# Patient Record
Sex: MALE | Race: WHITE | NOT HISPANIC OR LATINO
[De-identification: names, ages, dates, MRNs, and addresses within clinical notes are randomized per-mention and may not be internally consistent; named-entity substitution may affect disease eponyms.]

---

## 2022-10-15 ENCOUNTER — NON-APPOINTMENT (OUTPATIENT)
Age: 57
End: 2022-10-15

## 2022-10-16 ENCOUNTER — EMERGENCY (EMERGENCY)
Facility: HOSPITAL | Age: 57
LOS: 1 days | Discharge: HOME | End: 2022-10-16
Attending: STUDENT IN AN ORGANIZED HEALTH CARE EDUCATION/TRAINING PROGRAM | Admitting: STUDENT IN AN ORGANIZED HEALTH CARE EDUCATION/TRAINING PROGRAM

## 2022-10-16 VITALS
OXYGEN SATURATION: 100 % | HEART RATE: 85 BPM | SYSTOLIC BLOOD PRESSURE: 126 MMHG | TEMPERATURE: 98 F | RESPIRATION RATE: 16 BRPM | DIASTOLIC BLOOD PRESSURE: 89 MMHG

## 2022-10-16 VITALS
TEMPERATURE: 98 F | RESPIRATION RATE: 18 BRPM | DIASTOLIC BLOOD PRESSURE: 68 MMHG | OXYGEN SATURATION: 98 % | SYSTOLIC BLOOD PRESSURE: 135 MMHG | HEART RATE: 85 BPM

## 2022-10-16 LAB
ALBUMIN SERPL ELPH-MCNC: 4.1 G/DL — SIGNIFICANT CHANGE UP (ref 3.3–5)
ALP SERPL-CCNC: 73 U/L — SIGNIFICANT CHANGE UP (ref 40–120)
ALT FLD-CCNC: 19 U/L — SIGNIFICANT CHANGE UP (ref 4–41)
ANION GAP SERPL CALC-SCNC: 11 MMOL/L — SIGNIFICANT CHANGE UP (ref 7–14)
AST SERPL-CCNC: 23 U/L — SIGNIFICANT CHANGE UP (ref 4–40)
BASOPHILS # BLD AUTO: 0.05 K/UL — SIGNIFICANT CHANGE UP (ref 0–0.2)
BASOPHILS NFR BLD AUTO: 0.5 % — SIGNIFICANT CHANGE UP (ref 0–2)
BILIRUB SERPL-MCNC: 0.6 MG/DL — SIGNIFICANT CHANGE UP (ref 0.2–1.2)
BUN SERPL-MCNC: 14 MG/DL — SIGNIFICANT CHANGE UP (ref 7–23)
CALCIUM SERPL-MCNC: 9.1 MG/DL — SIGNIFICANT CHANGE UP (ref 8.4–10.5)
CHLORIDE SERPL-SCNC: 100 MMOL/L — SIGNIFICANT CHANGE UP (ref 98–107)
CO2 SERPL-SCNC: 24 MMOL/L — SIGNIFICANT CHANGE UP (ref 22–31)
CREAT SERPL-MCNC: 1.02 MG/DL — SIGNIFICANT CHANGE UP (ref 0.5–1.3)
EGFR: 86 ML/MIN/1.73M2 — SIGNIFICANT CHANGE UP
EOSINOPHIL # BLD AUTO: 0.03 K/UL — SIGNIFICANT CHANGE UP (ref 0–0.5)
EOSINOPHIL NFR BLD AUTO: 0.3 % — SIGNIFICANT CHANGE UP (ref 0–6)
GLUCOSE SERPL-MCNC: 90 MG/DL — SIGNIFICANT CHANGE UP (ref 70–99)
HCT VFR BLD CALC: 42.6 % — SIGNIFICANT CHANGE UP (ref 39–50)
HGB BLD-MCNC: 14.6 G/DL — SIGNIFICANT CHANGE UP (ref 13–17)
IANC: 7.69 K/UL — HIGH (ref 1.8–7.4)
IMM GRANULOCYTES NFR BLD AUTO: 0.3 % — SIGNIFICANT CHANGE UP (ref 0–0.9)
LIDOCAIN IGE QN: 35 U/L — SIGNIFICANT CHANGE UP (ref 7–60)
LYMPHOCYTES # BLD AUTO: 1.94 K/UL — SIGNIFICANT CHANGE UP (ref 1–3.3)
LYMPHOCYTES # BLD AUTO: 18.1 % — SIGNIFICANT CHANGE UP (ref 13–44)
MCHC RBC-ENTMCNC: 30.4 PG — SIGNIFICANT CHANGE UP (ref 27–34)
MCHC RBC-ENTMCNC: 34.3 GM/DL — SIGNIFICANT CHANGE UP (ref 32–36)
MCV RBC AUTO: 88.6 FL — SIGNIFICANT CHANGE UP (ref 80–100)
MONOCYTES # BLD AUTO: 1 K/UL — HIGH (ref 0–0.9)
MONOCYTES NFR BLD AUTO: 9.3 % — SIGNIFICANT CHANGE UP (ref 2–14)
NEUTROPHILS # BLD AUTO: 7.69 K/UL — HIGH (ref 1.8–7.4)
NEUTROPHILS NFR BLD AUTO: 71.5 % — SIGNIFICANT CHANGE UP (ref 43–77)
NRBC # BLD: 0 /100 WBCS — SIGNIFICANT CHANGE UP (ref 0–0)
NRBC # FLD: 0 K/UL — SIGNIFICANT CHANGE UP (ref 0–0)
PLATELET # BLD AUTO: 262 K/UL — SIGNIFICANT CHANGE UP (ref 150–400)
POTASSIUM SERPL-MCNC: 4.8 MMOL/L — SIGNIFICANT CHANGE UP (ref 3.5–5.3)
POTASSIUM SERPL-SCNC: 4.8 MMOL/L — SIGNIFICANT CHANGE UP (ref 3.5–5.3)
PROT SERPL-MCNC: 7.6 G/DL — SIGNIFICANT CHANGE UP (ref 6–8.3)
RBC # BLD: 4.81 M/UL — SIGNIFICANT CHANGE UP (ref 4.2–5.8)
RBC # FLD: 13.7 % — SIGNIFICANT CHANGE UP (ref 10.3–14.5)
SODIUM SERPL-SCNC: 135 MMOL/L — SIGNIFICANT CHANGE UP (ref 135–145)
TROPONIN T, HIGH SENSITIVITY RESULT: 6 NG/L — SIGNIFICANT CHANGE UP
WBC # BLD: 10.74 K/UL — HIGH (ref 3.8–10.5)
WBC # FLD AUTO: 10.74 K/UL — HIGH (ref 3.8–10.5)

## 2022-10-16 PROCEDURE — 74177 CT ABD & PELVIS W/CONTRAST: CPT | Mod: 26,MA

## 2022-10-16 PROCEDURE — 99285 EMERGENCY DEPT VISIT HI MDM: CPT

## 2022-10-16 PROCEDURE — 71045 X-RAY EXAM CHEST 1 VIEW: CPT | Mod: 26

## 2022-10-16 RX ORDER — AMPICILLIN SODIUM AND SULBACTAM SODIUM 250; 125 MG/ML; MG/ML
3 INJECTION, POWDER, FOR SUSPENSION INTRAMUSCULAR; INTRAVENOUS ONCE
Refills: 0 | Status: COMPLETED | OUTPATIENT
Start: 2022-10-16 | End: 2022-10-16

## 2022-10-16 RX ORDER — SODIUM CHLORIDE 9 MG/ML
1000 INJECTION INTRAMUSCULAR; INTRAVENOUS; SUBCUTANEOUS ONCE
Refills: 0 | Status: COMPLETED | OUTPATIENT
Start: 2022-10-16 | End: 2022-10-16

## 2022-10-16 RX ORDER — KETOROLAC TROMETHAMINE 30 MG/ML
15 SYRINGE (ML) INJECTION ONCE
Refills: 0 | Status: DISCONTINUED | OUTPATIENT
Start: 2022-10-16 | End: 2022-10-16

## 2022-10-16 RX ADMIN — AMPICILLIN SODIUM AND SULBACTAM SODIUM 200 GRAM(S): 250; 125 INJECTION, POWDER, FOR SUSPENSION INTRAMUSCULAR; INTRAVENOUS at 22:46

## 2022-10-16 RX ADMIN — Medication 15 MILLIGRAM(S): at 19:21

## 2022-10-16 RX ADMIN — SODIUM CHLORIDE 1000 MILLILITER(S): 9 INJECTION INTRAMUSCULAR; INTRAVENOUS; SUBCUTANEOUS at 19:20

## 2022-10-16 NOTE — ED PROVIDER NOTE - NS ED ROS FT
CONSTITUTIONAL: No fever,  EYES: No redness  ENT: no sore throat  CARDIOVASCULAR: No chest pain,  RESPIRATORY: No cough, no shortness of breath  GI: +abdominal pain, no nausea, no vomiting, no diarrhea   GENITOURINARY: No dysuria  MUSKULOSKELETAL: No new pain in joints/muscles  SKIN: No rash  NEURO: No headache  ALL OTHER SYSTEMS NEGATIVE.

## 2022-10-16 NOTE — ED PROVIDER NOTE - NSFOLLOWUPINSTRUCTIONS_ED_ALL_ED_FT
Colitis    WHAT YOU NEED TO KNOW:    Colitis is swelling and irritation of your colon. Colitis may be caused by ulcers or a problem with your immune system. Bacteria, a virus, or a parasite may also cause colitis. The cause may not be known. You may have diarrhea, abdominal pain, fever, or blood or mucus in your bowel movement.    DISCHARGE INSTRUCTIONS:    Return to the emergency department if:    You have sudden trouble breathing.    Your bowel movements are black or have blood in them.    You have blood in your vomit.    You have severe abdominal pain or your abdomen is swollen and feels hard.    You have any of the following signs of dehydration:  Dizziness or weakness    Dry mouth, cracked lips, or severe thirst    Fast heartbeat or breathing    Urinating very little or not at all  Call your doctor if:    Your symptoms get worse or do not go away.    You have a fever, chills, cough, or feel weak and achy.    You suddenly lose weight without trying.    You have questions or concerns about your condition or care.  Medicines:    Medicines may be given to decrease inflammation in your colon and treat diarrhea.    Take your medicine as directed. Contact your healthcare provider if you think your medicine is not helping or if you have side effects. Tell him of her if you are allergic to any medicine. Keep a list of the medicines, vitamins, and herbs you take. Include the amounts, and when and why you take them. Bring the list or the pill bottles to follow-up visits. Carry your medicine list with you in case of an emergency.  Manage your symptoms:    Drink liquids as directed to help prevent dehydration. Good liquids to drink include water, juice, and broth. Ask how much liquid to drink each day. You may need to drink an oral rehydration solution (ORS). An ORS contains a balance of water, salt, and sugar to replace body fluids lost during diarrhea.    Eat a variety of healthy foods. Healthy foods include fruits, vegetables, whole-grain breads, beans, low-fat dairy products, lean meats, and fish. You may need to eat several small meals throughout the day instead of large meals. Avoid spicy foods, caffeine, chocolate, and foods high in fat.    Talk to your healthcare provider before you take NSAIDs. NSAIDs can cause worsen your symptoms if ulcers are causing your colitis.    Start to exercise when you feel better. Regular exercise helps your bowels work normally. Ask about the best exercise plan for you.  Prevent the spread of germs:      Wash your hands often. Wash your hands several times each day. Wash after you use the bathroom, change a child's diaper, and before you prepare or eat food. Use soap and water every time. Rub your soapy hands together, lacing your fingers. Wash the front and back of your hands, and in between your fingers. Use the fingers of one hand to scrub under the fingernails of the other hand. Wash for at least 20 seconds. Rinse with warm, running water for several seconds. Then dry your hands with a clean towel or paper towel. Use hand  that contains alcohol if soap and water are not available. Do not touch your eyes, nose, or mouth without washing your hands first.  Handwashing      Cover a sneeze or cough. Use a tissue that covers your mouth and nose. Throw the tissue away in a trash can right away. Use the bend of your arm if a tissue is not available. Wash your hands well with soap and water or use a hand .    Clean surfaces often. Clean doorknobs, countertops, cell phones, and other surfaces that are touched often. Use a disinfecting wipe, a single-use sponge, or a cloth you can wash and reuse. Use disinfecting  if you do not have wipes. You can create a disinfecting  by mixing 1 part bleach with 10 parts water.    Ask about vaccines you may need. Vaccines help prevent disease caused by some viruses and bacteria. Get the influenza (flu) vaccine as soon as recommended each year. The flu vaccine is usually available starting in September or October. Flu viruses change, so it is important to get a flu vaccine every year. Get the pneumonia vaccine if recommended. This vaccine is usually recommended every 5 years. Your provider will tell you when to get this vaccine, if needed. Your healthcare provider can tell you if you should get other vaccines, and when to get them.  Follow up with your doctor as directed: You may need to return for a colonoscopy or other tests. Write down how often you have a bowel movements and what they look like. Bring this to your follow-up visits. Write down your questions so you remember to ask them during your visits.

## 2022-10-16 NOTE — ED PROVIDER NOTE - PROGRESS NOTE DETAILS
has colitis on ct scan, will send abx to pharmacy, gastroenterology follow up, Well appearing. Return precautions reviewed. Patient/family verbalized understand of conditions for return and plan for follow up. Patient/family was instructed to utilize [phone] to obtain follow up as indicated.

## 2022-10-16 NOTE — ED ADULT TRIAGE NOTE - CHIEF COMPLAINT QUOTE
p.t c/o of diffuse abd pain for 2 days, denies any nausea or vomiting, denies dysuria, last BM 3 days ago p.t c/o of diffuse abd pain for 2 days, denies any nausea or vomiting, denies dysuria, last BM 3 days ago, sent by PMD r/p appendicitis

## 2022-10-16 NOTE — ED PROVIDER NOTE - PATIENT PORTAL LINK FT
You can access the FollowMyHealth Patient Portal offered by [facility] by registering at the following website: [email or web address]. By joining [facility]’s FollowMyHealth portal, you will also be able to view your health information using other applications (apps) compatible with our system.

## 2022-10-16 NOTE — ED PROVIDER NOTE - OBJECTIVE STATEMENT
55 y/o m with no PMHx presents to the ED with abdominal pain for 2 days. Pt reports that abdominal pain is radiating to the chest. He went to [facility] and was told that he has an "issue with his appendix" and to come to the ED for a CAT scan. EKG preformed at the Urgent Care was normal. Pain is 6/10 and it comes and goes. He denies fever, vomiting, diarrhea. Pt could not go to the bathroom for 2 days. No medications taken for the pain. No history of abdominal surgeries. No cardiac history. Pt has a stress test done six months ago. No smoking history. Occasional alcohol usage. No known allergies to medications.

## 2022-10-16 NOTE — ED ADULT NURSE NOTE - OBJECTIVE STATEMENT
56 yom presents A&Ox4, c/o non-radiating RLQ abdominal pain x3 days. Pt denies any associated N/V/D. Last normal BM today. Respirations even and unlabored, abdomen tender to RLQ. Pt denies any chest pain, dyspnea, dizziness, blurry vision, recent fevers or trauma. No acute distress noted. 20g IV placed in L AC, labs sent per order. Pending CT. bed in lowest position, side rails up, call bell in hand, safety maintained.

## 2022-10-16 NOTE — ED ADULT NURSE NOTE - NS ED NOTE ABUSE SUSPICION NEGLECT YN
How Severe Is Your Nodular Acne?: moderate Is This A New Presentation, Or A Follow-Up?: Follow Up Nodular Acne No

## 2022-10-16 NOTE — ED ADULT NURSE NOTE - CHIEF COMPLAINT QUOTE
p.t c/o of diffuse abd pain for 2 days, denies any nausea or vomiting, denies dysuria, last BM 3 days ago, sent by PMD r/p appendicitis

## 2022-10-16 NOTE — ED PROVIDER NOTE - CLINICAL SUMMARY MEDICAL DECISION MAKING FREE TEXT BOX
57 y/o m with no PMHx presents to the ED with abdominal pain for 2 days. Will r/o appendicitis vs other possible acute surgical problem. Will order CAT scan and EKG to evaluate for pain that is radiating to the chest.

## 2022-10-16 NOTE — ED PROVIDER NOTE - PHYSICAL EXAMINATION
CONSTITUTIONAL: Non-toxic, non-diaphoretic, in no apparent distress  HEAD: Normocephalic; atraumatic  EYES: EOM intact   ENMT: External appears normal; normal oropharynx, moist  NECK: grossly normal active ROM,  CARD: No cyanosis, good peripheral perfusion, RRR  RESP: Normal chest excursion with respiration; no increased work of breathing  ABD: non-distended, +tenderness to palpitation of the right lower quadrant, +soft   EXT: moving all extremities, no gross disfigurement or asymmetry,  SKIN: Warm, dry, no rash  NEURO:  moving all extremities, no facial droop, no dysarthria      cn2-12 intact  5/5 all extremities CONSTITUTIONAL: Non-toxic, non-diaphoretic, in no apparent distress  HEAD: Normocephalic; atraumatic  EYES: EOM intact   ENMT: External appears normal; normal oropharynx, moist  NECK: grossly normal active ROM,  CARD: No cyanosis, good peripheral perfusion,   RESP: Normal chest excursion with respiration; no increased work of breathing  ABD: non-distended, +tenderness to palpitation of the right lower quadrant, +soft   EXT: moving all extremities, no gross disfigurement or asymmetry,  SKIN: Warm, dry, no rash  NEURO:  moving all extremities, no facial droop, no dysarthria      cn2-12 intact  5/5 all extremities

## 2023-01-06 PROBLEM — Z78.9 OTHER SPECIFIED HEALTH STATUS: Chronic | Status: ACTIVE | Noted: 2022-10-16

## 2023-01-12 PROBLEM — Z00.00 ENCOUNTER FOR PREVENTIVE HEALTH EXAMINATION: Status: ACTIVE | Noted: 2023-01-12

## 2023-02-01 ENCOUNTER — APPOINTMENT (OUTPATIENT)
Dept: GASTROENTEROLOGY | Facility: CLINIC | Age: 58
End: 2023-02-01
Payer: MEDICAID

## 2023-02-01 DIAGNOSIS — K22.2 ESOPHAGEAL OBSTRUCTION: ICD-10-CM

## 2023-02-01 DIAGNOSIS — Z01.818 ENCOUNTER FOR OTHER PREPROCEDURAL EXAMINATION: ICD-10-CM

## 2023-02-01 PROCEDURE — 99204 OFFICE O/P NEW MOD 45 MIN: CPT

## 2023-02-01 NOTE — ASSESSMENT
[FreeTextEntry1] : This is a 57 year old male here for an evaluation of esophageal stricture\par \par Plan\par EGD with balloon dilation - PT declined. He wants to think it over. Will call office once he makes up his mind. \par \par \par Risks, benefits, and alternatives of EGD  discussed at length with patient including but not limited to bleeding , perforation, anesthesia related complication, aspiration, etc. Patient expressed understanding.\par  \par  \par

## 2023-02-01 NOTE — PHYSICAL EXAM

## 2023-02-01 NOTE — HISTORY OF PRESENT ILLNESS
[FreeTextEntry1] : This is a 57 year old male here for an evaluation of esophageal stricture. Referred to us by Dr. Starr.  PMH  PMH HLD and  HTN. Denies a family history of colon CA, gastric CA, high risk polyps, Inflammatory and autoimmune disease. Patient had initially reported to Dr. Starr with RUQ pain. Underwent an EGD on 1/29/23 which revealed  an non obstructing distal esophageal ring. Pathology not available. Currently patient has difficulty swallowing solids. Solids >clears. He has to wash his food down with water, several times a week. Has intermittent reflux. Not on medication.  No N&V. No early satiety. No blood or dark tarry stools. Normal caliber. Weight stable. \par Smoke/EToh: EToh social. Non smoker \par Medication: No AC or antiplatelet. Rest in Chart.  \par Allergy:  none\par \par

## 2023-02-01 NOTE — REASON FOR VISIT
[Home] : at home, [unfilled] , at the time of the visit. [Medical Office: ([facility])___] : at the medical office located in  [Patient] : the patient [Self] : self [This encounter was initiated by telehealth (audio with video) and converted to telephone (audio only) due to technical difficulties.] : This encounter was initiated by telehealth (audio with video) and converted to telephone (audio only) due to technical difficulties. [Initial Evaluation] : an initial evaluation

## 2023-02-03 ENCOUNTER — NON-APPOINTMENT (OUTPATIENT)
Age: 58
End: 2023-02-03

## 2023-02-03 ENCOUNTER — APPOINTMENT (OUTPATIENT)
Dept: HEART FAILURE | Facility: CLINIC | Age: 58
End: 2023-02-03
Payer: MEDICAID

## 2023-02-03 VITALS
BODY MASS INDEX: 27.76 KG/M2 | OXYGEN SATURATION: 99 % | HEIGHT: 66 IN | TEMPERATURE: 97.9 F | DIASTOLIC BLOOD PRESSURE: 92 MMHG | SYSTOLIC BLOOD PRESSURE: 144 MMHG | HEART RATE: 64 BPM

## 2023-02-03 VITALS — WEIGHT: 172 LBS

## 2023-02-03 DIAGNOSIS — R55 SYNCOPE AND COLLAPSE: ICD-10-CM

## 2023-02-03 DIAGNOSIS — Z82.49 FAMILY HISTORY OF ISCHEMIC HEART DISEASE AND OTHER DISEASES OF THE CIRCULATORY SYSTEM: ICD-10-CM

## 2023-02-03 DIAGNOSIS — Z87.891 PERSONAL HISTORY OF NICOTINE DEPENDENCE: ICD-10-CM

## 2023-02-03 DIAGNOSIS — I10 ESSENTIAL (PRIMARY) HYPERTENSION: ICD-10-CM

## 2023-02-03 DIAGNOSIS — Z86.39 PERSONAL HISTORY OF OTHER ENDOCRINE, NUTRITIONAL AND METABOLIC DISEASE: ICD-10-CM

## 2023-02-03 LAB
ALBUMIN SERPL ELPH-MCNC: 4.9 G/DL
ALP BLD-CCNC: 76 U/L
ALT SERPL-CCNC: 20 U/L
ANION GAP SERPL CALC-SCNC: 14 MMOL/L
AST SERPL-CCNC: 17 U/L
BILIRUB SERPL-MCNC: 0.5 MG/DL
BUN SERPL-MCNC: 19 MG/DL
CALCIUM SERPL-MCNC: 9.5 MG/DL
CHLORIDE SERPL-SCNC: 102 MMOL/L
CHOLEST SERPL-MCNC: 278 MG/DL
CO2 SERPL-SCNC: 26 MMOL/L
CREAT SERPL-MCNC: 0.99 MG/DL
EGFR: 89 ML/MIN/1.73M2
GLUCOSE SERPL-MCNC: 84 MG/DL
HDLC SERPL-MCNC: 31 MG/DL
LDLC SERPL CALC-MCNC: 190 MG/DL
MAGNESIUM SERPL-MCNC: 2.1 MG/DL
NONHDLC SERPL-MCNC: 247 MG/DL
NT-PROBNP SERPL-MCNC: 8 PG/ML
POTASSIUM SERPL-SCNC: 4.6 MMOL/L
PROT SERPL-MCNC: 7.9 G/DL
SODIUM SERPL-SCNC: 142 MMOL/L
TRIGL SERPL-MCNC: 284 MG/DL
TSH SERPL-ACNC: 1.71 UIU/ML

## 2023-02-03 PROCEDURE — 99204 OFFICE O/P NEW MOD 45 MIN: CPT

## 2023-02-03 PROCEDURE — 93000 ELECTROCARDIOGRAM COMPLETE: CPT

## 2023-02-03 PROCEDURE — 36415 COLL VENOUS BLD VENIPUNCTURE: CPT

## 2023-02-03 RX ORDER — LISINOPRIL 10 MG/1
10 TABLET ORAL DAILY
Qty: 30 | Refills: 5 | Status: ACTIVE | COMMUNITY
Start: 2023-02-03

## 2023-02-03 RX ORDER — ROSUVASTATIN CALCIUM 5 MG/1
5 TABLET, FILM COATED ORAL DAILY
Refills: 0 | Status: ACTIVE | COMMUNITY
Start: 2023-02-03

## 2023-02-03 NOTE — PHYSICAL EXAM
[Well Nourished] : well nourished [No Acute Distress] : no acute distress [Normal Conjunctiva] : normal conjunctiva [Normal Venous Pressure] : normal venous pressure [Normal S1, S2] : normal S1, S2 [Clear Lung Fields] : clear lung fields [Good Air Entry] : good air entry [Soft] : abdomen soft [Non Tender] : non-tender [Normal Gait] : normal gait [No Edema] : no edema [Normal] : no rash, no skin lesions

## 2023-02-06 LAB
BASOPHILS # BLD AUTO: 0.08 K/UL
BASOPHILS NFR BLD AUTO: 1.1 %
EOSINOPHIL # BLD AUTO: 0.12 K/UL
EOSINOPHIL NFR BLD AUTO: 1.6 %
ESTIMATED AVERAGE GLUCOSE: 114 MG/DL
HBA1C MFR BLD HPLC: 5.6 %
HCT VFR BLD CALC: 46.3 %
HGB BLD-MCNC: 15.5 G/DL
IMM GRANULOCYTES NFR BLD AUTO: 0.4 %
LYMPHOCYTES # BLD AUTO: 2.93 K/UL
LYMPHOCYTES NFR BLD AUTO: 40.1 %
MAN DIFF?: NORMAL
MCHC RBC-ENTMCNC: 30.2 PG
MCHC RBC-ENTMCNC: 33.5 GM/DL
MCV RBC AUTO: 90.1 FL
MONOCYTES # BLD AUTO: 0.49 K/UL
MONOCYTES NFR BLD AUTO: 6.7 %
NEUTROPHILS # BLD AUTO: 3.65 K/UL
NEUTROPHILS NFR BLD AUTO: 50.1 %
PLATELET # BLD AUTO: 275 K/UL
RBC # BLD: 5.14 M/UL
RBC # FLD: 13.3 %
WBC # FLD AUTO: 7.3 K/UL

## 2023-02-18 NOTE — ASSESSMENT
[FreeTextEntry1] : 57 year old male with PMH  HLD and HTN ( 2-3 yrs), esophageal stricture, prior 25 pk-yr tobacco use who presents today for a cardiac evaluation after episode of syncope. Appears euvolemic with elevated B/P today. Suspect syncope may have been a vasovagal event in setting of hot environment. \par \par 1. Syncope\par - ? vasovagal \par - will order a 7 day Biotel monitor to R/O arrhythmia \par - encouraged hydration to 2 liters of fluid per day\par \par 2. HTN\par - B/P elevated today 144/92 but states home B/P 120 range\par - continue lisinopril 10 mg daily\par - keep a log of B/P with goal < 130/80\par - advised to limit salt to less than 2000 mg per day\par - exercise as tolerated\par \par 3. HLD\par - on rosuvastatin\par - check labs including lipid profile today\par -heart healthy diet\par \par 4. Family history of CAD\par - will obtain cardiac testing from cardiologist\par \par Follow up in office annually unless an abnormality is found on testing; will call with labs and results of testing\par

## 2023-02-18 NOTE — HISTORY OF PRESENT ILLNESS
[FreeTextEntry1] : 57 year old male with PMH  HLD and HTN ( 2-3 yrs), esophageal stricture, prior 25 pk-yr tobacco use who presents today for a cardiac evaluation after episode of syncope. Referred by friend. PCP: Dr. Marley Chavez , Card: Dr. Adam Hughes\par \par Per patient, had been well when he was at a spa in steam room with friends and passed out in the spa. He felt some lightheadedness before episode. States he was out for approx 3 minutes. States he doesn't remember event but they put cold water on him, No prior episodes. He did not go to the ED. Prior to steam, he was smoking marijuana.\par \par He reports a normal stress test and echo approx 7 months ago ( In [place]- Dr. Adam Mcneill- [phone]). \par He is on medications lisinopril 10 mg daily which he crushes since difficult to swallow and rosuvastatin 5 mg ( 2-3 years) Home B/P 127/82\par \par Currently, his activity is not limited by shortness of breath. He can climb several flights of stairs without chest pain, dyspnea. He sleeps with 2 pillows with no orthopnea/PND. Admits that he does not drink a lot of fluids.\par \par Pt had an ED visit 10/16/22 with abdominal pain with CXR clear, labs WNL and CT of abdomen with colitis. Pt followed up for a consult 2/1/23 with GI  with EGD on 1/29/23 which revealed an non obstructing distal esophageal ring. \par \par He denies chest pain, palpitations, dizziness/LH and one episode of syncope.

## 2023-04-03 ENCOUNTER — NON-APPOINTMENT (OUTPATIENT)
Age: 58
End: 2023-04-03